# Patient Record
Sex: MALE | Race: WHITE | NOT HISPANIC OR LATINO | Employment: FULL TIME | URBAN - METROPOLITAN AREA
[De-identification: names, ages, dates, MRNs, and addresses within clinical notes are randomized per-mention and may not be internally consistent; named-entity substitution may affect disease eponyms.]

---

## 2019-01-20 ENCOUNTER — HOSPITAL ENCOUNTER (EMERGENCY)
Facility: HOSPITAL | Age: 39
Discharge: HOME | End: 2019-01-20
Attending: EMERGENCY MEDICINE | Admitting: EMERGENCY MEDICINE

## 2019-01-20 ENCOUNTER — APPOINTMENT (OUTPATIENT)
Dept: GENERAL RADIOLOGY | Facility: HOSPITAL | Age: 39
End: 2019-01-20

## 2019-01-20 VITALS
WEIGHT: 315 LBS | DIASTOLIC BLOOD PRESSURE: 110 MMHG | OXYGEN SATURATION: 97 % | BODY MASS INDEX: 45.1 KG/M2 | TEMPERATURE: 99 F | HEIGHT: 70 IN | SYSTOLIC BLOOD PRESSURE: 146 MMHG | RESPIRATION RATE: 18 BRPM | HEART RATE: 98 BPM

## 2019-01-20 DIAGNOSIS — S39.012A LUMBAR STRAIN, INITIAL ENCOUNTER: ICD-10-CM

## 2019-01-20 DIAGNOSIS — S46.911A RIGHT SHOULDER STRAIN, INITIAL ENCOUNTER: Primary | ICD-10-CM

## 2019-01-20 PROCEDURE — 73030 X-RAY EXAM OF SHOULDER: CPT

## 2019-01-20 PROCEDURE — 99282 EMERGENCY DEPT VISIT SF MDM: CPT | Performed by: EMERGENCY MEDICINE

## 2019-01-20 PROCEDURE — 99283 EMERGENCY DEPT VISIT LOW MDM: CPT

## 2019-01-20 RX ORDER — IBUPROFEN 400 MG/1
800 TABLET ORAL ONCE
Status: COMPLETED | OUTPATIENT
Start: 2019-01-20 | End: 2019-01-20

## 2019-01-20 RX ORDER — RANITIDINE 150 MG/1
150 TABLET ORAL 2 TIMES DAILY
COMMUNITY

## 2019-01-20 RX ORDER — LISINOPRIL 20 MG/1
20 TABLET ORAL DAILY
COMMUNITY

## 2019-01-20 RX ORDER — GABAPENTIN 300 MG/1
300 CAPSULE ORAL 3 TIMES DAILY
COMMUNITY

## 2019-01-20 RX ORDER — METHOCARBAMOL 500 MG/1
500 TABLET, FILM COATED ORAL 4 TIMES DAILY PRN
Qty: 15 TABLET | Refills: 0 | Status: SHIPPED | OUTPATIENT
Start: 2019-01-20

## 2019-01-20 RX ORDER — OMEPRAZOLE 20 MG/1
20 CAPSULE, DELAYED RELEASE ORAL DAILY
COMMUNITY

## 2019-01-20 RX ADMIN — IBUPROFEN 800 MG: 400 TABLET ORAL at 08:35

## 2019-01-20 NOTE — ED NOTES
Pt said his worker's comp staff are on their way and he will wait for them in the waiting room     Hayley Avery RN  01/20/19 2348

## 2019-01-20 NOTE — ED PROVIDER NOTES
EMERGENCY DEPARTMENT ENCOUNTER      Room Number: 1B/1B      HPI:    Chief complaint: R shoulder and low back pain s/p slip on ice    Location: as above    Quality/Severity:  moderate    Timing/Duration: abrupt onset this am    Modifying Factors: pain c ROM R shoulder and swivel low back    Associated Symptoms: denies numbness, weakness. No saddle anesthesia or incontinence. Nml gait preserved    Narrative: Pt is a 38 y.o. 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  Denies direct blunt trauma rather distraction injury to the right shoulder and twisting of the low back.  Pt has hx of low back pain and DDD.      PMD: [place]    REVIEW OF SYSTEMS  Review of Systems  All other systems reviewed are otherwise negative as related chief complaint.    PAST MEDICAL HISTORY  Active Ambulatory Problems     Diagnosis Date Noted   • No Active Ambulatory Problems     Resolved Ambulatory Problems     Diagnosis Date Noted   • No Resolved Ambulatory Problems     Past Medical History:   Diagnosis Date   • Chronic back pain    • GERD (gastroesophageal reflux disease)    • Hypertension    • Psoriasis        PAST SURGICAL HISTORY  Past Surgical History:   Procedure Laterality Date   • CIRCUMCISION, PRIMARY      in fifth grade   • VASECTOMY  2003       FAMILY HISTORY  History reviewed. No pertinent family history.    SOCIAL HISTORY  Social History     Socioeconomic History   • Marital status: Single     Spouse name: Not on file   • Number of children: Not on file   • Years of education: Not on file   • Highest education level: Not on file   Social Needs   • Financial resource strain: Not on file   • Food insecurity - worry: Not on file   • Food insecurity - inability: Not on file   • Transportation needs - medical: Not on file   • Transportation needs - non-medical: Not on file   Occupational History   • Not on file   Tobacco Use   • Smoking status: Former  Smoker     Types: Cigarettes     Last attempt to quit: 2010     Years since quittin.0   • Smokeless tobacco: Current User     Types: Snuff   Substance and Sexual Activity   • Alcohol use: No     Frequency: Never   • Drug use: No   • Sexual activity: Defer   Other Topics Concern   • Not on file   Social History Narrative   • Not on file       ALLERGIES  Patient has no known allergies.    PHYSICAL EXAM  ED Triage Vitals [19 0755]   Temp Heart Rate Resp BP SpO2   -- 98 18 137/97 100 %      Temp src Heart Rate Source Patient Position BP Location FiO2 (%)   -- Monitor Sitting Left arm --         Physical Exam   Constitutional: He is oriented to person, place, and time and well-developed, well-nourished, and in no distress. No distress.   HENT:   Head: Normocephalic.   Mucous membranes moist   Eyes: No scleral icterus.   Neck:   Painless range of motion   Cardiovascular: Normal rate and regular rhythm.   Pulmonary/Chest: Effort normal and breath sounds normal. No respiratory distress.   Musculoskeletal:        Back:         Arms:  Moves all extremities equally   Neurological: He is alert and oriented to person, place, and time. He exhibits normal muscle tone. Gait normal. Coordination normal. GCS score is 15.   Skin: Skin is warm and dry.   Psychiatric: Mood and affect normal.   Nursing note and vitals reviewed.      LAB RESULTS  No results found for this or any previous visit.      I ordered the above labs and reviewed the results    RADIOLOGY  RADIOLOGY        Study: Right shoulder-2 views    Findings: no fx or sublux    Interpreted contemporaneously with treatment by me, independently viewed by me      I ordered the above radiologic testing and reviewed the results    PROCEDURES  Procedures   Sling to R arm.    PROGRESS AND CONSULTS           MEDICAL DECISION MAKING  Results were reviewed/discussed with the patient and they were also made aware of online access. Pt also made aware that some labs, such as  cultures, will not be resulted during ER visit and follow up with PMD is necessary.     PR[facility] [place] query complete. Treatment plan to include limited course of prescribed controlled substance.  Risks including addiction, tolerance, sedation, benefits and alternatives presented to patient.    DIAGNOSIS  Final diagnoses:   Right shoulder strain, initial encounter   Lumbar strain, initial encounter       Latest Documented Vital Signs:  As of 8:05 AM  BP- 137/97 HR- 98 Temp- 98.4 °F (36.9 °C) O2 sat- 100%    DISPOSITION  D/C - stable       Medication List      New Prescriptions    diclofenac 50 MG EC tablet  Commonly known as:  VOLTAREN  Take one tablet by mouth up to 3 times daily as needed for pain     methocarbamol 500 MG tablet  Commonly known as:  ROBAXIN  Take 1 tablet by mouth 4 (Four) Times a Day As Needed for Muscle Spasms.          Follow-up Information     Your primary care provider. Schedule an appointment as soon as possible for a visit in 1 week.    Why:  For injury re-check                      Martell Pat MD  01/20/19 0845